# Patient Record
(demographics unavailable — no encounter records)

---

## 2024-11-01 NOTE — HISTORY OF PRESENT ILLNESS
[de-identified] : 77-year-old woman returns status post open reduction internal fixation of trans olecranon fracture dislocation with open reduction internal fixation olecranon repair radial head repair of medial and lateral collateral ligaments.  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  Sensation in her fingers has resumed.  She is retained wrist extension now and is starting to get small finger extension.  She remains in occupational therapy.  She remains on Tylenol for pain relief.

## 2024-11-01 NOTE — ASSESSMENT
[FreeTextEntry1] : 2-1/2-month status post a reduction droll fixation of a left elbow fracture dislocation recovering uneventfully.  She did have neuropraxia's which seem to be responding to the time and therapy.  I like her to continue with occupational therapy.  We can also try to fit her for a Dynasplint so that she can work at home on flexion and extension of her elbow.  I would see her back in my office in 3 months.  She has no lifting restriction at this time.

## 2024-11-01 NOTE — IMAGING
[de-identified] : Paty late middle-aged woman sits comfortably my office in no distress.  Physical examination: Left elbow: Surgical incision is healed and remodeled.  Grossly intact light sensation about her fingers.  She cannot extend her wrist with ulnar and radial extensor muscles working.  She can extend her small finger to the plane of her hand.  Still has a drift of her second third and fourth fingers and thumb.  Good capillary refill.  2+ radial pulses.  Elbow motion is now 35 to 120 degrees.  Supination is now 80 degrees and pronation 60 degrees.  Radiographs: Left elbow (AP, lateral, oblique): Fracture alignment maintained.  Heart remains in place.  Elbow remains concentrically reduced.